# Patient Record
Sex: FEMALE | Race: WHITE
[De-identification: names, ages, dates, MRNs, and addresses within clinical notes are randomized per-mention and may not be internally consistent; named-entity substitution may affect disease eponyms.]

---

## 2019-08-12 ENCOUNTER — HOSPITAL ENCOUNTER (OUTPATIENT)
Dept: HOSPITAL 92 - LABBT | Age: 44
Discharge: HOME | End: 2019-08-12
Attending: ORTHOPAEDIC SURGERY
Payer: COMMERCIAL

## 2019-08-12 DIAGNOSIS — G57.61: ICD-10-CM

## 2019-08-12 DIAGNOSIS — Z01.818: Primary | ICD-10-CM

## 2019-08-12 LAB
PREGS CONTROL BACKGROUND?: (no result)
PREGS CONTROL BAR APPEAR?: YES

## 2019-08-12 PROCEDURE — 84703 CHORIONIC GONADOTROPIN ASSAY: CPT

## 2019-08-12 PROCEDURE — 93005 ELECTROCARDIOGRAM TRACING: CPT

## 2019-08-12 PROCEDURE — 93010 ELECTROCARDIOGRAM REPORT: CPT

## 2019-08-13 NOTE — HP
HISTORY OF PRESENT ILLNESS:  The patient is a 44-year-old female who has had

persistent problems with her right foot since being stepped on __________ in January

of this year.  She has had pain and burning in her foot and numbness in the second

webspace.  She has had symptoms of Valderrama's neuroma.  She did obtain a temporary

relief from injection of cortisone approximately 6 weeks ago, but does have

recurrent symptoms that are now interfering with day-to-day activities including

walking, getting dressed, and wearing shoe wear. 



PAST MEDICAL HISTORY:  The patient is otherwise in good health.  She has no major

medical problems. 



MEDICATIONS:  Takes birth control pills, multivitamin.



ALLERGIES:  SHE IS ALLERGIC TO PENICILLIN.



FAMILY HISTORY:  Otherwise unremarkable.



SOCIAL HISTORY:  Otherwise unremarkable.



REVIEW OF SYSTEMS:  Otherwise unremarkable.



PHYSICAL EXAMINATION:

GENERAL:  Reveals a healthy female. 

HEENT:  Unremarkable. 

NECK:  Supple. 

CHEST:  Clear. 

HEART:  Regular rate and rhythm. 

ABDOMEN:  Soft, nontender. 

PELVIC:  Deferred. 

RECTAL:  Deferred. 

BREASTS:  Deferred. 

EXTREMITIES:  Pertinent findings with the right foot, there is slight hallux valgus,

but no tenderness.  There is full range of motion.  There is decreased sensation in

the first and second toes.  There is tenderness in the second webspace between the

second and third metatarsal heads and pain with compression of the metatarsal heads.

 There are good distal pulses.  Motor exam is intact. 



DIAGNOSTIC STUDIES:  X-rays of the foot are normal.  MRI scan of the right foot is

normal. 



IMPRESSION:  Valderrama's neuroma, second interspace, right foot.



PLAN:  Surgical excision of Valderrama's neuroma, second interspace, right foot.  The

nature of the surgery, length of recovery, and potential complications such as

infection, loss of motion, incomplete relief, nerve injury, recurrence, and need for

additional treatment and repeat surgery have been discussed in detail. 







Job ID:  [id number]

## 2019-08-13 NOTE — EKG
Test Reason : 

Blood Pressure : ***/*** mmHG

Vent. Rate : 055 BPM     Atrial Rate : 055 BPM

   P-R Int : 128 ms          QRS Dur : 074 ms

    QT Int : 414 ms       P-R-T Axes : 037 058 056 degrees

   QTc Int : 396 ms

 

Sinus bradycardia

Otherwise normal ECG

No previous ECGs available

Confirmed by CHRISTIAN TRAVIS ([id number]) on 8/13/2019 10:56:20 PM

 

Referred By:  LUIS ARMANDO           Confirmed By:CHRISTIAN TRAVIS

## 2019-08-14 ENCOUNTER — HOSPITAL ENCOUNTER (OUTPATIENT)
Dept: HOSPITAL 92 - SDC | Age: 44
Discharge: HOME | End: 2019-08-14
Attending: ORTHOPAEDIC SURGERY
Payer: COMMERCIAL

## 2019-08-14 VITALS — BODY MASS INDEX: 20.9 KG/M2

## 2019-08-14 DIAGNOSIS — Z79.899: ICD-10-CM

## 2019-08-14 DIAGNOSIS — G57.61: Primary | ICD-10-CM

## 2019-08-14 DIAGNOSIS — Z88.0: ICD-10-CM

## 2019-08-14 DIAGNOSIS — Z79.82: ICD-10-CM

## 2019-08-14 PROCEDURE — S0020 INJECTION, BUPIVICAINE HYDRO: HCPCS

## 2019-08-14 PROCEDURE — 01BG0ZZ EXCISION OF TIBIAL NERVE, OPEN APPROACH: ICD-10-PCS | Performed by: ORTHOPAEDIC SURGERY

## 2019-08-14 NOTE — OP
DATE OF PROCEDURE:  08/14/2019



PREOPERATIVE DIAGNOSIS:  Valderrama's neuroma, 2nd interspace, right foot.



POSTOPERATIVE DIAGNOSIS:  Valderrama's neuroma, 2nd interspace, right foot.



PROCEDURE PERFORMED:  Excision of Valderrama's neuroma, 2nd interspace, right foot.



ANESTHESIA:  General.



DESCRIPTION OF PROCEDURE:  After satisfactory anesthesia was induced in supine

position, the patient was prepped and draped in the routine manner.  The right leg

was elevated and exsanguinated with an Esmarch bandage and the tourniquet was

inflated to 250 mmHg.  A 3-cm longitudinal incision was made over the dorsal aspect

of the foot in the 2nd web space and carried down the subcutaneous tissues and

bleeding points were controlled with Bovie cautery.  Using sharp and blunt

dissection, the interspace was developed.  A lamina  was placed between the

2nd and 3rd metatarsal necks  the metatarsals apart.  The transverse

metatarsal ligament was divided and the digital nerve between the 2nd and 3rd

metatarsal heads identified and cut distally and the nerve then pulled distally and

cut and allowed the proximal end to retract proximally between the bases of the 2nd

and 3rd metatarsal heads.  It appeared to be a complete excision.  The wound was

thoroughly irrigated.  The wound was infiltrated with 10 mL of 0.5% plain Marcaine.

The skin was closed with interrupted 3-0 nylon.  A sterile bulky compressive

dressing was applied and the tourniquet was deflated after approximately 22 minutes.

 The foot promptly pinked up.  The patient was placed into a postop shoe, awakened,

taken from the operative room in stable condition.  There were no apparent

intraoperative complications.  The estimated blood loss was negligible. 



The patient will be discharged home in satisfactory condition, instructed on ice and

elevation, use of crutches and given written wound care instructions.  She was given

a prescription for Norco 5 for pain, 28 tablets.  She will be rechecked in my office

in 10 to 14 days or sooner if there are any problems prior to that time. 





Job ID:  [id number]